# Patient Record
Sex: MALE | Race: WHITE | NOT HISPANIC OR LATINO | Employment: FULL TIME | URBAN - METROPOLITAN AREA
[De-identification: names, ages, dates, MRNs, and addresses within clinical notes are randomized per-mention and may not be internally consistent; named-entity substitution may affect disease eponyms.]

---

## 2017-01-28 DIAGNOSIS — F17.200 NICOTINE DEPENDENCE: ICD-10-CM

## 2017-01-30 RX ORDER — BUPROPION HYDROCHLORIDE 150 MG/1
TABLET, EXTENDED RELEASE ORAL
Qty: 60 TABLET | Refills: 1 | Status: SHIPPED | OUTPATIENT
Start: 2017-01-30 | End: 2017-03-29 | Stop reason: SDUPTHER

## 2017-03-29 DIAGNOSIS — F17.200 NICOTINE DEPENDENCE: ICD-10-CM

## 2017-03-29 RX ORDER — BUPROPION HYDROCHLORIDE 150 MG/1
TABLET, EXTENDED RELEASE ORAL
Qty: 60 TABLET | Refills: 1 | Status: SHIPPED | OUTPATIENT
Start: 2017-03-29 | End: 2017-04-19

## 2017-04-04 ENCOUNTER — TELEPHONE (OUTPATIENT)
Dept: SMOKING CESSATION | Facility: CLINIC | Age: 37
End: 2017-04-04

## 2017-04-05 ENCOUNTER — CLINICAL SUPPORT (OUTPATIENT)
Dept: SMOKING CESSATION | Facility: CLINIC | Age: 37
End: 2017-04-05
Payer: COMMERCIAL

## 2017-04-05 DIAGNOSIS — F17.200 NICOTINE DEPENDENCE: Primary | ICD-10-CM

## 2017-04-05 PROCEDURE — 99407 BEHAV CHNG SMOKING > 10 MIN: CPT | Mod: S$GLB,,,

## 2017-04-19 ENCOUNTER — OFFICE VISIT (OUTPATIENT)
Dept: INTERNAL MEDICINE | Facility: CLINIC | Age: 37
End: 2017-04-19
Payer: MEDICAID

## 2017-04-19 VITALS
OXYGEN SATURATION: 97 % | SYSTOLIC BLOOD PRESSURE: 121 MMHG | WEIGHT: 258.63 LBS | DIASTOLIC BLOOD PRESSURE: 86 MMHG | HEIGHT: 72 IN | BODY MASS INDEX: 35.03 KG/M2 | HEART RATE: 103 BPM | TEMPERATURE: 99 F

## 2017-04-19 DIAGNOSIS — J32.9 SINUSITIS, UNSPECIFIED CHRONICITY, UNSPECIFIED LOCATION: Primary | ICD-10-CM

## 2017-04-19 DIAGNOSIS — J40 BRONCHITIS: ICD-10-CM

## 2017-04-19 PROCEDURE — 99213 OFFICE O/P EST LOW 20 MIN: CPT | Mod: PBBFAC | Performed by: INTERNAL MEDICINE

## 2017-04-19 PROCEDURE — 99213 OFFICE O/P EST LOW 20 MIN: CPT | Mod: S$PBB,,, | Performed by: INTERNAL MEDICINE

## 2017-04-19 PROCEDURE — 99999 PR PBB SHADOW E&M-EST. PATIENT-LVL III: CPT | Mod: PBBFAC,,, | Performed by: INTERNAL MEDICINE

## 2017-04-19 RX ORDER — PROMETHAZINE HYDROCHLORIDE AND CODEINE PHOSPHATE 6.25; 1 MG/5ML; MG/5ML
5 SOLUTION ORAL EVERY 6 HOURS PRN
Qty: 180 ML | Refills: 0 | Status: SHIPPED | OUTPATIENT
Start: 2017-04-19 | End: 2017-04-29

## 2017-04-19 RX ORDER — FLUTICASONE PROPIONATE 50 MCG
2 SPRAY, SUSPENSION (ML) NASAL DAILY
Qty: 16 G | Refills: 12 | Status: SHIPPED | OUTPATIENT
Start: 2017-04-19 | End: 2017-05-19

## 2017-04-19 RX ORDER — AMOXICILLIN 875 MG/1
875 TABLET, FILM COATED ORAL EVERY 12 HOURS
Qty: 20 TABLET | Refills: 0 | Status: SHIPPED | OUTPATIENT
Start: 2017-04-19

## 2017-04-19 NOTE — PROGRESS NOTES
Subjective:       Patient ID: Pa Espana is a 37 y.o. male.    Chief Complaint: Cough    Cough   This is a new problem. The current episode started in the past 7 days. The problem has been gradually worsening. The problem occurs every few minutes. The cough is productive of purulent sputum. Associated symptoms include chest pain, headaches, myalgias, nasal congestion, postnasal drip, rhinorrhea and a sore throat. Pertinent negatives include no chills, ear congestion, ear pain, fever, heartburn, hemoptysis, rash, shortness of breath, sweats, weight loss or wheezing. Nothing aggravates the symptoms. He has tried OTC cough suppressant and rest for the symptoms. The treatment provided mild relief. His past medical history is significant for bronchitis and pneumonia. There is no history of asthma, bronchiectasis, COPD, emphysema or environmental allergies.     Review of Systems   Constitutional: Negative for chills, fever and weight loss.   HENT: Positive for postnasal drip, rhinorrhea and sore throat. Negative for ear pain.    Respiratory: Positive for cough. Negative for hemoptysis, shortness of breath and wheezing.    Cardiovascular: Positive for chest pain.   Gastrointestinal: Negative for heartburn.   Musculoskeletal: Positive for myalgias.   Skin: Negative for rash.   Allergic/Immunologic: Negative for environmental allergies.   Neurological: Positive for headaches.       Objective:      Physical Exam   Constitutional: He is oriented to person, place, and time. He appears well-developed and well-nourished. No distress.   HENT:   Head: Normocephalic and atraumatic.   Right Ear: External ear normal.   Left Ear: External ear normal.   Mouth/Throat:       Eyes: Conjunctivae and EOM are normal. Pupils are equal, round, and reactive to light.   Neck: Normal range of motion. Neck supple. No thyromegaly present.   Cardiovascular: Normal rate and regular rhythm.    Pulmonary/Chest: Effort normal. He has rhonchi in the  right lower field and the left lower field.   Abdominal: Soft. Bowel sounds are normal. He exhibits no mass. There is no tenderness. There is no rebound and no guarding.   Musculoskeletal: Normal range of motion.   Lymphadenopathy:     He has no cervical adenopathy.   Neurological: He is alert and oriented to person, place, and time. He has normal reflexes. He displays normal reflexes. No cranial nerve deficit. He exhibits normal muscle tone. Coordination normal.   Skin: Skin is warm and dry.       Assessment:       1. Sinusitis, unspecified chronicity, unspecified location    2. Bronchitis        Plan:   Pa Mcnair was seen today for cough.    Diagnoses and all orders for this visit:    Sinusitis, unspecified chronicity, unspecified location    Bronchitis    Other orders  -     amoxicillin (AMOXIL) 875 MG tablet; Take 1 tablet (875 mg total) by mouth every 12 (twelve) hours.  -     fluticasone (FLONASE) 50 mcg/actuation nasal spray; 2 sprays by Each Nare route once daily.  -     promethazine-codeine 6.25-10 mg/5 ml (PHENERGAN WITH CODEINE) 6.25-10 mg/5 mL syrup; Take 5 mLs by mouth every 6 (six) hours as needed.

## 2017-04-19 NOTE — MR AVS SNAPSHOT
[facility]  [address]  [facility] [address]  Phone: [phone]  Fax: [phone]                  Pa Espaan   2017 2:00 PM   Office Visit    Description:  Male : 1980   Provider:  Jayashree Salgado MD   Department:  [facility]           Reason for Visit     Cough           Diagnoses this Visit        Comments    Sinusitis, unspecified chronicity, unspecified location    -  Primary     Bronchitis                To Do List           Goals (5 Years of Data)     None       These Medications        Disp Refills Start End    amoxicillin (AMOXIL) 875 MG tablet 20 tablet 0 2017     Take 1 tablet (875 mg total) by mouth every 12 (twelve) hours. - Oral    Pharmacy: [facility]/pharmacy #[id number] - [place], [place] - [address] Ph #: [phone]       fluticasone (FLONASE) 50 mcg/actuation nasal spray 16 g 12 2017    2 sprays by Each Nare route once daily. - Each Nare    Pharmacy: [facility]/pharmacy #[id number] - [place], [place] - [address] Ph #: [phone]       promethazine-codeine 6.25-10 mg/5 ml (PHENERGAN WITH CODEINE) 6.25-10 mg/5 mL syrup 180 mL 0 2017    Take 5 mLs by mouth every 6 (six) hours as needed. - Oral    Pharmacy: [facility]/pharmacy #[id number] - [place], [place] - [address] Ph #: [phone]         [facility]     [facility] -  Assistance  Unless otherwise directed by your provider, please contact [facility], our nurse care line that is available for  assistance.     Registered nurses in the [facility] provide: appointment scheduling, clinical advisement, health education, and other advisory services.  Call: [phone] (toll free)               Medications           Message regarding Medications     Verify the changes and/or additions to your medication regime listed below are the same as discussed with your clinician today.  If any of these changes or additions are  incorrect, please notify your healthcare provider.        START taking these NEW medications        Refills    amoxicillin (AMOXIL) 875 MG tablet 0    Sig: Take 1 tablet (875 mg total) by mouth every 12 (twelve) hours.    Class: Normal    Route: Oral    fluticasone (FLONASE) 50 mcg/actuation nasal spray 12    Si sprays by Each Nare route once daily.    Class: Normal    Route: Each Nare    promethazine-codeine 6.25-10 mg/5 ml (PHENERGAN WITH CODEINE) 6.25-10 mg/5 mL syrup 0    Sig: Take 5 mLs by mouth every 6 (six) hours as needed.    Class: Normal    Route: Oral      STOP taking these medications     sulindac (CLINORIL) 150 MG tablet Take 1 tablet (150 mg total) by mouth 2 (two) times daily.    buPROPion (WELLBUTRIN SR) 150 MG TBSR 12 hr tablet TAKE 1 TABLET BY MOUTH DAILY FOR 7 DAYS AND THEN TWICE A DAY ON DAY 8 & THEREAFTER    blood pressure test kit-large Kit 1 Units by Misc.(Non-Drug; Combo Route) route once daily.    azithromycin (Z-JAIRO) 250 MG tablet Take 1 tablet (250 mg total) by mouth as directed. 2 tabs on day 1 then 1 tab on days 2-5    aspirin 81 MG Chew Take 1 tablet (81 mg total) by mouth once daily.    albuterol 90 mcg/actuation inhaler Inhale 1-2 puffs into the lungs every 6 (six) hours as needed for Wheezing.           Verify that the below list of medications is an accurate representation of the medications you are currently taking.  If none reported, the list may be blank. If incorrect, please contact your healthcare provider. Carry this list with you in case of emergency.           Current Medications     amlodipine (NORVASC) 5 MG tablet TAKE 1 TABLET BY MOUTH ONCE DAILY.    hydrochlorothiazide (HYDRODIURIL) 25 MG tablet Take 1 tablet (25 mg total) by mouth once daily.    omeprazole (PRILOSEC) 40 MG capsule Take 1 capsule (40 mg total) by mouth once daily. Try to stop medication and monitor for symptoms.    amoxicillin (AMOXIL) 875 MG tablet Take 1 tablet (875 mg total) by mouth every 12  (twelve) hours.    fluticasone (FLONASE) 50 mcg/actuation nasal spray 2 sprays by Each Nare route once daily.    promethazine-codeine 6.25-10 mg/5 ml (PHENERGAN WITH CODEINE) 6.25-10 mg/5 mL syrup Take 5 mLs by mouth every 6 (six) hours as needed.    UNABLE TO FIND medication name: cpap at 12mm H20           Clinical Reference Information           Your Vitals Were     BP Pulse Temp Height Weight SpO2    121/86 103 98.7 °F (37.1 °C) (Oral) 6' (1.829 m) 117.3 kg (258 lb 9.6 oz) 97%    BMI                35.07 kg/m2          Blood Pressure          Most Recent Value    BP  121/86      Allergies as of 4/19/2017     No Known Allergies      Immunizations Administered on Date of Encounter - 4/19/2017     None      Language Assistance Services     ATTENTION: Language assistance services are available, free of charge. Please call [phone].      ATENCIÓN: Si habla español, tiene a cheatham disposición servicios gratuitos de asistencia lingüística. Llame al [phone].     MICK Ý: N?u b?n nói Ti?ng Vi?t, có các d?ch v? h? tr? ngôn ng? mi?n phí dành cho b?n. G?i s? [phone].         [facility] complies with applicable Federal civil rights laws and does not discriminate on the basis of race, color, national origin, age, disability, or sex.

## 2017-07-03 RX ORDER — AMLODIPINE BESYLATE 5 MG/1
TABLET ORAL
Qty: 30 TABLET | Refills: 2 | Status: SHIPPED | OUTPATIENT
Start: 2017-07-03 | End: 2017-08-02 | Stop reason: SDUPTHER

## 2017-08-02 DIAGNOSIS — K21.9 GASTROESOPHAGEAL REFLUX DISEASE, ESOPHAGITIS PRESENCE NOT SPECIFIED: ICD-10-CM

## 2017-08-02 DIAGNOSIS — I10 ESSENTIAL HYPERTENSION: ICD-10-CM

## 2017-08-02 RX ORDER — OMEPRAZOLE 40 MG/1
40 CAPSULE, DELAYED RELEASE ORAL DAILY
Qty: 30 CAPSULE | Refills: 8 | Status: SHIPPED | OUTPATIENT
Start: 2017-08-02 | End: 2018-01-15 | Stop reason: SDUPTHER

## 2017-08-02 RX ORDER — AMLODIPINE BESYLATE 5 MG/1
5 TABLET ORAL DAILY
Qty: 30 TABLET | Refills: 2 | Status: SHIPPED | OUTPATIENT
Start: 2017-08-02 | End: 2018-01-10 | Stop reason: SDUPTHER

## 2017-08-02 RX ORDER — HYDROCHLOROTHIAZIDE 25 MG/1
25 TABLET ORAL DAILY
Qty: 90 TABLET | Refills: 3 | Status: SHIPPED | OUTPATIENT
Start: 2017-08-02 | End: 2018-01-15 | Stop reason: SDUPTHER

## 2017-09-13 ENCOUNTER — CLINICAL SUPPORT (OUTPATIENT)
Dept: SMOKING CESSATION | Facility: CLINIC | Age: 37
End: 2017-09-13
Payer: COMMERCIAL

## 2017-09-13 DIAGNOSIS — F17.200 NICOTINE DEPENDENCE: Primary | ICD-10-CM

## 2017-09-13 PROCEDURE — 99407 BEHAV CHNG SMOKING > 10 MIN: CPT | Mod: S$GLB,,, | Performed by: INTERNAL MEDICINE

## 2018-01-10 RX ORDER — AMLODIPINE BESYLATE 5 MG/1
5 TABLET ORAL DAILY
Qty: 30 TABLET | Refills: 0 | Status: SHIPPED | OUTPATIENT
Start: 2018-01-10 | End: 2018-02-09 | Stop reason: SDUPTHER

## 2018-01-15 DIAGNOSIS — I10 ESSENTIAL HYPERTENSION: ICD-10-CM

## 2018-01-15 DIAGNOSIS — K21.9 GASTROESOPHAGEAL REFLUX DISEASE, ESOPHAGITIS PRESENCE NOT SPECIFIED: ICD-10-CM

## 2018-01-15 RX ORDER — OMEPRAZOLE 40 MG/1
40 CAPSULE, DELAYED RELEASE ORAL DAILY
Qty: 30 CAPSULE | Refills: 0 | Status: SHIPPED | OUTPATIENT
Start: 2018-01-15 | End: 2018-03-14 | Stop reason: SDUPTHER

## 2018-01-15 RX ORDER — HYDROCHLOROTHIAZIDE 25 MG/1
25 TABLET ORAL DAILY
Qty: 30 TABLET | Refills: 0 | Status: SHIPPED | OUTPATIENT
Start: 2018-01-15 | End: 2018-03-13 | Stop reason: SDUPTHER

## 2018-02-09 RX ORDER — AMLODIPINE BESYLATE 5 MG/1
5 TABLET ORAL DAILY
Qty: 30 TABLET | Refills: 0 | Status: SHIPPED | OUTPATIENT
Start: 2018-02-09 | End: 2018-03-15 | Stop reason: SDUPTHER

## 2018-03-13 DIAGNOSIS — Z00.00 HEALTH CARE MAINTENANCE: Primary | ICD-10-CM

## 2018-03-13 DIAGNOSIS — I10 ESSENTIAL HYPERTENSION: ICD-10-CM

## 2018-03-13 RX ORDER — HYDROCHLOROTHIAZIDE 25 MG/1
25 TABLET ORAL DAILY
Qty: 90 TABLET | Refills: 0 | Status: SHIPPED | OUTPATIENT
Start: 2018-03-13 | End: 2018-07-08 | Stop reason: SDUPTHER

## 2018-03-13 NOTE — TELEPHONE ENCOUNTER
Requested medication refilled. Pt is overdue for annual physical. Please schedule next available. Will need to be seen in order to continue to get refills.   Fasting labs ordered - please do prior to appt

## 2018-03-14 DIAGNOSIS — K21.9 GASTROESOPHAGEAL REFLUX DISEASE, ESOPHAGITIS PRESENCE NOT SPECIFIED: ICD-10-CM

## 2018-03-14 RX ORDER — OMEPRAZOLE 40 MG/1
40 CAPSULE, DELAYED RELEASE ORAL DAILY
Qty: 90 CAPSULE | Refills: 0 | Status: SHIPPED | OUTPATIENT
Start: 2018-03-14 | End: 2018-07-08 | Stop reason: SDUPTHER

## 2018-03-15 RX ORDER — AMLODIPINE BESYLATE 5 MG/1
5 TABLET ORAL DAILY
Qty: 30 TABLET | Refills: 0 | Status: SHIPPED | OUTPATIENT
Start: 2018-03-15 | End: 2018-09-08 | Stop reason: SDUPTHER

## 2018-04-24 ENCOUNTER — OFFICE VISIT (OUTPATIENT)
Dept: INTERNAL MEDICINE | Facility: CLINIC | Age: 38
End: 2018-04-24
Payer: COMMERCIAL

## 2018-04-24 VITALS
WEIGHT: 269.63 LBS | HEART RATE: 76 BPM | BODY MASS INDEX: 36.52 KG/M2 | DIASTOLIC BLOOD PRESSURE: 86 MMHG | OXYGEN SATURATION: 99 % | SYSTOLIC BLOOD PRESSURE: 118 MMHG | HEIGHT: 72 IN

## 2018-04-24 DIAGNOSIS — H65.01 RIGHT ACUTE SEROUS OTITIS MEDIA, RECURRENCE NOT SPECIFIED: Primary | ICD-10-CM

## 2018-04-24 DIAGNOSIS — I10 ESSENTIAL HYPERTENSION: ICD-10-CM

## 2018-04-24 PROCEDURE — 99999 PR PBB SHADOW E&M-EST. PATIENT-LVL III: CPT | Mod: PBBFAC,,, | Performed by: NURSE PRACTITIONER

## 2018-04-24 PROCEDURE — 99213 OFFICE O/P EST LOW 20 MIN: CPT | Mod: S$GLB,,, | Performed by: NURSE PRACTITIONER

## 2018-04-24 NOTE — PROGRESS NOTES
INTERNAL MEDICINE PROGRESS/URGENT CARE NOTE    CHIEF COMPLAINT     Chief Complaint   Patient presents with    Otalgia     R ear pain x3 days     Ear Fullness       HPI     Pa Espana is a 38 y.o. male with htn, gerd, alethea and tobacco use who presents for an urgent visit today.    Here with c/o right ear pain and fullness x 3 days   Pain started this weekend but pain has improved in the last 2 days.   Treated with peroxide.   Feels imbalanced     Past Medical History:  Past Medical History:   Diagnosis Date    Acid reflux     Hypertension     Nicotine dependence     Sleep apnea 07/13/2016    CPAP 13mmHg       Home Medications:  Prior to Admission medications    Medication Sig Start Date End Date Taking? Authorizing Provider   amLODIPine (NORVASC) 5 MG tablet TAKE 1 TABLET (5 MG TOTAL) BY MOUTH ONCE DAILY. 3/15/18   Lauri Murrell MD   amoxicillin (AMOXIL) 875 MG tablet Take 1 tablet (875 mg total) by mouth every 12 (twelve) hours. 4/19/17   Jayashree Salgado MD   hydroCHLOROthiazide (HYDRODIURIL) 25 MG tablet Take 1 tablet (25 mg total) by mouth once daily. 3/13/18 3/13/19  Cielo Morataya MD   omeprazole (PRILOSEC) 40 MG capsule Take 1 capsule (40 mg total) by mouth once daily. Try to stop medication and monitor for symptoms. 3/14/18   Cielo Morataya MD   UNABLE TO FIND medication name: cpap at 12mm H20    Historical Provider, MD       Review of Systems:  Review of Systems   Constitutional: Negative for chills and fever.   HENT: Positive for ear pain, hearing loss (hearing muffled ), postnasal drip, rhinorrhea and sneezing. Negative for congestion, ear discharge, sinus pain and sinus pressure.    Respiratory: Negative for cough, shortness of breath and wheezing.    Cardiovascular: Negative for chest pain and palpitations.   Skin: Negative for rash.   Neurological: Positive for dizziness. Negative for light-headedness and headaches.       Health Maintainence:   Immunizations:  Health  Maintenance       Date Due Completion Date    Influenza Vaccine 08/01/2017 ---    Lipid Panel 05/06/2019 5/6/2014    TETANUS VACCINE 01/01/2020 1/1/2010 (Done)    Override on 1/1/2010: Done           PHYSICAL EXAM     /86 (BP Location: Left arm, Patient Position: Sitting, BP Method: Large (Manual))   Pulse 76   Ht 6' (1.829 m)   Wt 122.3 kg (269 lb 10 oz)   SpO2 99%   BMI 36.57 kg/m²     Physical Exam   Constitutional: He is oriented to person, place, and time. He appears well-developed and well-nourished.   HENT:   Head: Normocephalic and atraumatic.   Right Ear: External ear normal. Tympanic membrane is injected. A middle ear effusion is present.   Left Ear: External ear normal. Tympanic membrane is injected. A middle ear effusion is present.   Nose: Mucosal edema present. No rhinorrhea or sinus tenderness. Right sinus exhibits no maxillary sinus tenderness and no frontal sinus tenderness. Left sinus exhibits no maxillary sinus tenderness and no frontal sinus tenderness.   Mouth/Throat: Uvula is midline and oropharynx is clear and moist.   Eyes: Pupils are equal, round, and reactive to light.   Cardiovascular: Normal rate, regular rhythm, normal heart sounds and intact distal pulses.    Pulmonary/Chest: Effort normal and breath sounds normal.   Neurological: He is alert and oriented to person, place, and time.   Psychiatric: He has a normal mood and affect.   Vitals reviewed.      LABS     Lab Results   Component Value Date    HGBA1C 5.8 11/05/2014     CMP  Sodium   Date Value Ref Range Status   07/13/2016 142 136 - 145 mmol/L Final     Potassium   Date Value Ref Range Status   07/13/2016 4.2 3.5 - 5.1 mmol/L Final     Chloride   Date Value Ref Range Status   07/13/2016 102 95 - 110 mmol/L Final     CO2   Date Value Ref Range Status   07/13/2016 32 (H) 23 - 29 mmol/L Final     Glucose   Date Value Ref Range Status   07/13/2016 101 70 - 110 mg/dL Final     BUN, Bld   Date Value Ref Range Status    07/13/2016 11 6 - 20 mg/dL Final     Creatinine   Date Value Ref Range Status   07/13/2016 1.1 0.5 - 1.4 mg/dL Final     Calcium   Date Value Ref Range Status   07/13/2016 9.5 8.7 - 10.5 mg/dL Final     Total Protein   Date Value Ref Range Status   07/13/2016 7.4 6.0 - 8.4 g/dL Final     Albumin   Date Value Ref Range Status   07/13/2016 4.0 3.5 - 5.2 g/dL Final     Total Bilirubin   Date Value Ref Range Status   07/13/2016 0.4 0.1 - 1.0 mg/dL Final     Comment:     For infants and newborns, interpretation of results should be based  on gestational age, weight and in agreement with clinical  observations.  Premature Infant recommended reference ranges:  Up to 24 hours.............<8.0 mg/dL  Up to 48 hours............<12.0 mg/dL  3-5 days..................<15.0 mg/dL  6-29 days.................<15.0 mg/dL       Alkaline Phosphatase   Date Value Ref Range Status   07/13/2016 70 55 - 135 U/L Final     AST   Date Value Ref Range Status   07/13/2016 26 10 - 40 U/L Final     ALT   Date Value Ref Range Status   07/13/2016 48 (H) 10 - 44 U/L Final     Anion Gap   Date Value Ref Range Status   07/13/2016 8 8 - 16 mmol/L Final     eGFR if    Date Value Ref Range Status   07/13/2016 >60.0 >60 mL/min/1.73 m^2 Final     eGFR if non    Date Value Ref Range Status   07/13/2016 >60.0 >60 mL/min/1.73 m^2 Final     Comment:     Calculation used to obtain the estimated glomerular filtration  rate (eGFR) is the CKD-EPI equation. Since race is unknown   in our information system, the eGFR values for   -American and Non--American patients are given   for each creatinine result.       Lab Results   Component Value Date    WBC 7.73 07/13/2016    HGB 16.4 07/13/2016    HCT 49.5 07/13/2016    MCV 87 07/13/2016     07/13/2016     Lab Results   Component Value Date    CHOL 181 05/06/2014    CHOL 198 12/14/2009     Lab Results   Component Value Date    HDL 30 (L) 05/06/2014    HDL 41  12/14/2009     Lab Results   Component Value Date    LDLCALC 109.2 05/06/2014    LDLCALC 121.0 12/14/2009     Lab Results   Component Value Date    TRIG 209 (H) 05/06/2014    TRIG 180 (H) 12/14/2009     Lab Results   Component Value Date    CHOLHDL 16.6 (L) 05/06/2014    CHOLHDL 20.7 12/14/2009     Lab Results   Component Value Date    TSH 1.211 11/05/2014       ASSESSMENT/PLAN     Pa Espana is a 38 y.o. male with  Past Medical History:   Diagnosis Date    Acid reflux     Hypertension     Nicotine dependence     Sleep apnea 07/13/2016    CPAP 13mmHg     Right acute serous otitis media, recurrence not specified- advised to use flonase 2 squirts daily.     Essential hypertension- at goal. Cont current meds. Low na diet           Follow up as needed and with PCP for annual physical     Patient education provided from [facility]. Patient was counseled on when and how to seek emergent care.       Meghan DAVIS, APRN, FNP-c   Department of Internal Medicine - [facility]  10:45 AM

## 2018-07-08 DIAGNOSIS — K21.9 GASTROESOPHAGEAL REFLUX DISEASE, ESOPHAGITIS PRESENCE NOT SPECIFIED: ICD-10-CM

## 2018-07-08 DIAGNOSIS — I10 ESSENTIAL HYPERTENSION: ICD-10-CM

## 2018-07-09 RX ORDER — OMEPRAZOLE 40 MG/1
40 CAPSULE, DELAYED RELEASE ORAL DAILY
Qty: 90 CAPSULE | Refills: 0 | Status: SHIPPED | OUTPATIENT
Start: 2018-07-09 | End: 2018-10-07 | Stop reason: SDUPTHER

## 2018-07-09 RX ORDER — HYDROCHLOROTHIAZIDE 25 MG/1
25 TABLET ORAL DAILY
Qty: 90 TABLET | Refills: 0 | Status: SHIPPED | OUTPATIENT
Start: 2018-07-09 | End: 2018-10-07 | Stop reason: SDUPTHER

## 2018-08-21 RX ORDER — AMLODIPINE BESYLATE 5 MG/1
5 TABLET ORAL DAILY
Qty: 30 TABLET | Refills: 0 | OUTPATIENT
Start: 2018-08-21

## 2018-09-10 RX ORDER — AMLODIPINE BESYLATE 5 MG/1
5 TABLET ORAL DAILY
Qty: 30 TABLET | Refills: 11 | Status: SHIPPED | OUTPATIENT
Start: 2018-09-10

## 2018-10-07 DIAGNOSIS — I10 ESSENTIAL HYPERTENSION: ICD-10-CM

## 2018-10-07 DIAGNOSIS — K21.9 GASTROESOPHAGEAL REFLUX DISEASE, ESOPHAGITIS PRESENCE NOT SPECIFIED: ICD-10-CM

## 2018-10-07 RX ORDER — HYDROCHLOROTHIAZIDE 25 MG/1
TABLET ORAL
Qty: 90 TABLET | Refills: 0 | Status: SHIPPED | OUTPATIENT
Start: 2018-10-07

## 2018-10-07 RX ORDER — OMEPRAZOLE 40 MG/1
40 CAPSULE, DELAYED RELEASE ORAL DAILY
Qty: 90 CAPSULE | Refills: 0 | Status: SHIPPED | OUTPATIENT
Start: 2018-10-07